# Patient Record
Sex: FEMALE | Race: WHITE | NOT HISPANIC OR LATINO | Employment: OTHER | URBAN - METROPOLITAN AREA
[De-identification: names, ages, dates, MRNs, and addresses within clinical notes are randomized per-mention and may not be internally consistent; named-entity substitution may affect disease eponyms.]

---

## 2023-07-20 ENCOUNTER — TELEPHONE (OUTPATIENT)
Dept: NEUROLOGY | Facility: CLINIC | Age: 68
End: 2023-07-20

## 2023-07-20 NOTE — TELEPHONE ENCOUNTER
----- Message from Micaela Luna sent at 7/20/2023  3:24 PM CDT -----  Contact: Esperanza with [facility]  Type:  Sooner Apoointment Request    Caller is requesting a sooner appointment. Caller will not accept being placed on the waitlist and is requesting a message be sent to doctor.  Name of Caller:Esperanza with [facility]  When is the first available appointment? unknown  Symptoms:Dementia  Would the patient rather a call back or a response via [facility]? call  Best Call Back Number:[phone]  Additional Information: Esperanza request to schedule soonest available appointment for the patient. Please call Esperanza to assist.

## 2023-07-20 NOTE — TELEPHONE ENCOUNTER
Spoke with Esperanza advised her that pt appt is still scheduled for 10/16/2023  last name was was spelled in correctly chart corrected MRN [id number]. Chart set to merge

## 2023-10-16 ENCOUNTER — OFFICE VISIT (OUTPATIENT)
Dept: NEUROLOGY | Facility: CLINIC | Age: 68
End: 2023-10-16
Payer: MEDICARE

## 2023-10-16 ENCOUNTER — HOSPITAL ENCOUNTER (OUTPATIENT)
Dept: CARDIOLOGY | Facility: HOSPITAL | Age: 68
Discharge: HOME | End: 2023-10-16
Payer: MEDICARE

## 2023-10-16 VITALS
SYSTOLIC BLOOD PRESSURE: 125 MMHG | RESPIRATION RATE: 16 BRPM | HEIGHT: 69 IN | DIASTOLIC BLOOD PRESSURE: 67 MMHG | HEART RATE: 77 BPM | BODY MASS INDEX: 32.98 KG/M2 | WEIGHT: 222.69 LBS

## 2023-10-16 DIAGNOSIS — Z81.8 FAMILY HISTORY OF DEMENTIA: ICD-10-CM

## 2023-10-16 DIAGNOSIS — G30.9 MAJOR NEUROCOGNITIVE DISORDER DUE TO ALZHEIMER'S DISEASE, WITHOUT BEHAVIORAL DISTURBANCE: Primary | ICD-10-CM

## 2023-10-16 DIAGNOSIS — Z87.898 HISTORY OF SEIZURES: ICD-10-CM

## 2023-10-16 DIAGNOSIS — Z91.89 AT RISK FOR PROLONGED QT INTERVAL SYNDROME: ICD-10-CM

## 2023-10-16 DIAGNOSIS — R68.89 FORGETFULNESS: ICD-10-CM

## 2023-10-16 DIAGNOSIS — F02.80 MAJOR NEUROCOGNITIVE DISORDER DUE TO ALZHEIMER'S DISEASE, WITHOUT BEHAVIORAL DISTURBANCE: Primary | ICD-10-CM

## 2023-10-16 PROCEDURE — 1101F PT FALLS ASSESS-DOCD LE1/YR: CPT | Mod: CPTII,S$GLB,, | Performed by: NURSE PRACTITIONER

## 2023-10-16 PROCEDURE — 93005 ELECTROCARDIOGRAM TRACING: CPT

## 2023-10-16 PROCEDURE — 1125F PR PAIN SEVERITY QUANTIFIED, PAIN PRESENT: ICD-10-PCS | Mod: CPTII,S$GLB,, | Performed by: NURSE PRACTITIONER

## 2023-10-16 PROCEDURE — 93010 ELECTROCARDIOGRAM REPORT: CPT | Mod: ,,, | Performed by: INTERNAL MEDICINE

## 2023-10-16 PROCEDURE — 99417 PROLNG OP E/M EACH 15 MIN: CPT | Mod: S$GLB,,, | Performed by: NURSE PRACTITIONER

## 2023-10-16 PROCEDURE — 1159F PR MEDICATION LIST DOCUMENTED IN MEDICAL RECORD: ICD-10-PCS | Mod: CPTII,S$GLB,, | Performed by: NURSE PRACTITIONER

## 2023-10-16 PROCEDURE — 99205 PR OFFICE/OUTPT VISIT, NEW, LEVL V, 60-74 MIN: ICD-10-PCS | Mod: S$GLB,,, | Performed by: NURSE PRACTITIONER

## 2023-10-16 PROCEDURE — 93010 EKG 12-LEAD: ICD-10-PCS | Mod: ,,, | Performed by: INTERNAL MEDICINE

## 2023-10-16 PROCEDURE — 3078F DIAST BP <80 MM HG: CPT | Mod: CPTII,S$GLB,, | Performed by: NURSE PRACTITIONER

## 2023-10-16 PROCEDURE — 1159F MED LIST DOCD IN RCRD: CPT | Mod: CPTII,S$GLB,, | Performed by: NURSE PRACTITIONER

## 2023-10-16 PROCEDURE — 99999 PR PBB SHADOW E&M-EST. PATIENT-LVL V: CPT | Mod: PBBFAC,,, | Performed by: NURSE PRACTITIONER

## 2023-10-16 PROCEDURE — 99417 PR PROLONGED SVC, OUTPT, W/WO DIRECT PT CONTACT,  EA ADDTL 15 MIN: ICD-10-PCS | Mod: S$GLB,,, | Performed by: NURSE PRACTITIONER

## 2023-10-16 PROCEDURE — 99999 PR PBB SHADOW E&M-EST. PATIENT-LVL V: ICD-10-PCS | Mod: PBBFAC,,, | Performed by: NURSE PRACTITIONER

## 2023-10-16 PROCEDURE — 3074F PR MOST RECENT SYSTOLIC BLOOD PRESSURE < 130 MM HG: ICD-10-PCS | Mod: CPTII,S$GLB,, | Performed by: NURSE PRACTITIONER

## 2023-10-16 PROCEDURE — 1125F AMNT PAIN NOTED PAIN PRSNT: CPT | Mod: CPTII,S$GLB,, | Performed by: NURSE PRACTITIONER

## 2023-10-16 PROCEDURE — 3008F BODY MASS INDEX DOCD: CPT | Mod: CPTII,S$GLB,, | Performed by: NURSE PRACTITIONER

## 2023-10-16 PROCEDURE — 3008F PR BODY MASS INDEX (BMI) DOCUMENTED: ICD-10-PCS | Mod: CPTII,S$GLB,, | Performed by: NURSE PRACTITIONER

## 2023-10-16 PROCEDURE — 1101F PR PT FALLS ASSESS DOC 0-1 FALLS W/OUT INJ PAST YR: ICD-10-PCS | Mod: CPTII,S$GLB,, | Performed by: NURSE PRACTITIONER

## 2023-10-16 PROCEDURE — 99205 OFFICE O/P NEW HI 60 MIN: CPT | Mod: S$GLB,,, | Performed by: NURSE PRACTITIONER

## 2023-10-16 PROCEDURE — 3074F SYST BP LT 130 MM HG: CPT | Mod: CPTII,S$GLB,, | Performed by: NURSE PRACTITIONER

## 2023-10-16 PROCEDURE — 3288F PR FALLS RISK ASSESSMENT DOCUMENTED: ICD-10-PCS | Mod: CPTII,S$GLB,, | Performed by: NURSE PRACTITIONER

## 2023-10-16 PROCEDURE — 3078F PR MOST RECENT DIASTOLIC BLOOD PRESSURE < 80 MM HG: ICD-10-PCS | Mod: CPTII,S$GLB,, | Performed by: NURSE PRACTITIONER

## 2023-10-16 PROCEDURE — 3288F FALL RISK ASSESSMENT DOCD: CPT | Mod: CPTII,S$GLB,, | Performed by: NURSE PRACTITIONER

## 2023-10-16 RX ORDER — LEVOTHYROXINE SODIUM 112 UG/1
112 TABLET ORAL
COMMUNITY
Start: 2023-08-08

## 2023-10-16 RX ORDER — PROMETHAZINE HYDROCHLORIDE 25 MG/1
25 TABLET ORAL EVERY 6 HOURS PRN
COMMUNITY

## 2023-10-16 RX ORDER — SERTRALINE HYDROCHLORIDE 100 MG/1
150 TABLET, FILM COATED ORAL
COMMUNITY
Start: 2023-10-02

## 2023-10-16 RX ORDER — DONEPEZIL HYDROCHLORIDE 10 MG/1
10 TABLET, FILM COATED ORAL NIGHTLY
Qty: 30 TABLET | Refills: 11 | Status: SHIPPED | OUTPATIENT
Start: 2023-10-16 | End: 2024-10-15

## 2023-10-16 RX ORDER — MEMANTINE HYDROCHLORIDE 10 MG/1
10 TABLET ORAL 2 TIMES DAILY
Qty: 60 TABLET | Refills: 11 | Status: SHIPPED | OUTPATIENT
Start: 2023-10-16 | End: 2024-10-15

## 2023-10-16 RX ORDER — SIMVASTATIN 20 MG/1
20 TABLET, FILM COATED ORAL NIGHTLY
COMMUNITY

## 2023-10-16 RX ORDER — GABAPENTIN 300 MG/1
300 CAPSULE ORAL 3 TIMES DAILY
COMMUNITY

## 2023-10-16 NOTE — PROGRESS NOTES
Subjective:       Patient ID: Liliana Martinez is a 68 y.o. female.    Chief Complaint: Dementia          HPIThe patient is here for memory loss evaluation without behavioral changes.     The patient is new to me, presenting with memory issues that began in  2020-year. The patient is accompanied by spouse. The main problems the patient has are related to forgetfulness and short term memory loss. Patient also concerned due to muliple family members with AD. Patient spouse gave many examples: For example, the patient would repeat the same question repeatedly. The patient excessively forgets where placed certain things. She misplace things and spouse assist with finding.  The patient also started forgetting names but able to recover. The patient no longer driving, stopped driving 2022 she had a seizure. No confusion around and inside the house. Has trouble remembering the date and time. Spouse assist with  keeping up with medications and appointments. Patient is unaware of major holidays and  Paitent is aware changes. The patient is independent in handling finances. The patient is still independent with ADLs. No agitation or aggression. No hallucinations or delusions. Patient had hx of  seizures, reports she was not started on seizures medications. However patient is taking   TID for chronic knee pain. Patient has history of hypothyroidism. No history of alcoholism. No history of B12 deficiency. No history of Untreated Syphilis.  No history of HIV infection. No toxic exposures.  No history of traumatic brain injury. No tremors or abnormal movements. No falls or instability. No urinary incontinence. No change in sleep and good appetite. Mother had Dementia (Late 70's) Sister (72 ye family history of dementia.           Review of Systems   Unable to perform ROS: Dementia   Constitutional: Negative.    HENT: Negative.     Eyes: Negative.    Gastrointestinal: Negative.    Endocrine: Negative.    Genitourinary:  Negative.    Musculoskeletal:  Positive for arthralgias and myalgias.   Skin: Negative.    Psychiatric/Behavioral:  Positive for confusion, decreased concentration and dysphoric mood. The patient is nervous/anxious.                  Current Outpatient Medications:     gabapentin (NEURONTIN) 300 MG capsule, Take 300 mg by mouth 3 (three) times daily., Disp: , Rfl:     levothyroxine (SYNTHROID) 112 MCG tablet, Take 112 mcg by mouth., Disp: , Rfl:     promethazine (PHENERGAN) 25 MG tablet, Take 25 mg by mouth every 6 (six) hours as needed for Nausea., Disp: , Rfl:     sertraline (ZOLOFT) 100 MG tablet, Take 150 mg by mouth., Disp: , Rfl:     simvastatin (ZOCOR) 20 MG tablet, Take 20 mg by mouth every evening., Disp: , Rfl:     donepeziL (ARICEPT) 10 MG tablet, Take 1 tablet (10 mg total) by mouth every evening., Disp: 30 tablet, Rfl: 11    memantine (NAMENDA) 10 MG Tab, Take 1 tablet (10 mg total) by mouth 2 (two) times daily., Disp: 60 tablet, Rfl: 11  History reviewed. No pertinent past medical history.  Past Surgical History:   Procedure Laterality Date    TOTAL KNEE ARTHROPLASTY       Social History     Socioeconomic History    Marital status:    Tobacco Use    Smoking status: Never    Smokeless tobacco: Never   Substance and Sexual Activity    Alcohol use: Never    Drug use: Never    Sexual activity: Yes             Past/Current Medical/Surgical History, Past/Current Social History, Past/Current Family History and Past/Current Medications were reviewed in detail.        Objective:           VITAL SIGNS WERE REVIEWED      GENERAL APPEARANCE:     The patient looks comfortable.    Body habitus is normal 32.88 KG     No signs of respiratory distress.    Normal breathing pattern.    No dysmorphic features    Normal eye contact.     GENERAL MEDICAL EXAM:    HEENT:  Head is atraumatic normocephalic.     No tender temporal arteries. Fundoscopic (Ophthalmoscopic) exam showed no disc edema.      Neck and Axillae: No  JVD. No visible lesions.    No carotid bruits. No thyromegaly. No lymphadenopathy.    Cardiopulmonary: No cyanosis. No tachypnea. Normal respiratory effort.    Gastrointestinal/Urogenital:  No jaundice. No stomas or lesions. No visible hernias. No catheters.     Abdomen is soft non-tender. No masses or organomegaly.    Skin, Hair and Nails: No pathognonomic skin rash. No neurofibromatosis. No visible lesions.No stigmata of autoimmune disease. No clubbing.    Skin is warm and moist. No palpable masses.    Limbs: No varicose veins. No visible swelling.    No palpable edema. Pulses are symmetric. Pedal pulses are palpable.      Muskoskeletal: No visible deformities.No visible lesions.    No spine tenderness. No signs of longstanding neuropathy. No dislocations or fractures.            Neurologic Exam     Mental Status   Oriented to person, place, and time.   Follows 2 step commands.   Attention: decreased.   Speech: speech is normal and not slurred   Level of consciousness: alert  Knowledge: inconsistent with education and poor. Unable to perform simple calculations.   Able to name object. Able to read. Able to repeat. Able to write (abnormal unble to follow simple commanad). Abnormal comprehension.     Cranial Nerves     CN II   Visual fields full to confrontation.   Visual acuity: normal with correction  Right visual field deficit: none  Left visual field deficit: none     CN III, IV, VI   Pupils are equal, round, and reactive to light.  Extraocular motions are normal.   Right pupil: Size: 2 mm. Shape: regular. Reactivity: brisk. Consensual response: intact. Accommodation: intact.   Left pupil: Size: 2 mm. Shape: regular. Reactivity: brisk. Consensual response: intact. Accommodation: intact.   CN III: no CN III palsy  CN VI: no CN VI palsy  Nystagmus: none   Diplopia: none  Ophthalmoparesis: none  Upgaze: normal  Downgaze: normal  Conjugate gaze: present  Vestibulo-ocular reflex: present    CN V   Facial sensation  intact.   Right facial sensation deficit: none  Left facial sensation deficit: none    CN VII   Right facial weakness: none  Left facial weakness: none    CN VIII   CN VIII normal.   Hearing: intact    CN IX, X   CN IX normal.   CN X normal.   Palate: symmetric  Right gag reflex: normal  Left gag reflex: normal    CN XI   CN XI normal.   Right sternocleidomastoid strength: normal  Left sternocleidomastoid strength: normal  Right trapezius strength: normal  Left trapezius strength: normal    CN XII   CN XII normal.   Tongue: not atrophic  Fasciculations: absent  Tongue deviation: none    Motor Exam   Muscle bulk: normal  Overall muscle tone: normal  Right arm tone: normal  Left arm tone: normal  Right arm pronator drift: absent  Left arm pronator drift: absent  Right leg tone: normal  Left leg tone: normal    Strength   Strength 5/5 throughout.   Right neck flexion: 5/5  Left neck flexion: 5/5  Right neck extension: 5/5  Left neck extension: 5/5  Right deltoid: 5/5  Left deltoid: 5/5  Right biceps: 5/5  Left biceps: 5/5  Right triceps: 5/5  Left triceps: 5/5  Right wrist flexion: 5/5  Left wrist flexion: 5/5  Right wrist extension: 5/5  Left wrist extension: 5/5  Right interossei: 5/5  Left interossei: 5/5  Right iliopsoas: 5/5  Left iliopsoas: 5/5  Right quadriceps: 5/5  Left quadriceps: 5/5  Right hamstrin/5  Left hamstrin/5  Right glutei: 5/5  Left glutei: 5/5  Right anterior tibial: 5/5  Left anterior tibial: 5/5  Right posterior tibial: 5/5  Left posterior tibial: 5/5  Right peroneal: 5/5  Left peroneal: 5/5  Right gastroc: 5/5  Left gastroc: 5/5    Sensory Exam   Right arm light touch: normal  Left arm light touch: normal  Right leg light touch: decreased from toes  Left leg light touch: decreased from toes  Right arm vibration: normal  Left arm vibration: normal  Right leg vibration: decreased from toes  Left leg vibration: decreased from toes  Right arm proprioception: normal  Left arm proprioception:  "normal  Right leg proprioception: decreased from toes  Left leg proprioception: decreased from toes  Right arm pinprick: normal  Left arm pinprick: normal  Right leg pinprick: decreased from toes  Left leg pinprick: decreased from toes  Graphesthesia: normal  Stereognosis: normal    Gait, Coordination, and Reflexes     Gait  Gait: normal    Coordination   Romberg: negative  Finger to nose coordination: normal  Heel to shin coordination: normal  Tandem walking coordination: normal    Tremor   Resting tremor: absent  Intention tremor: absent  Action tremor: absent    Reflexes   Right brachioradialis: 2+  Left brachioradialis: 2+  Right biceps: 2+  Left biceps: 2+  Right triceps: 2+  Left triceps: 2+  Right patellar: 2+  Left patellar: 2+  Right achilles: 2+  Left achilles: 2+  Right plantar: normal  Left plantar: normal  Right Allan: absent  Left Allan: absent  Right ankle clonus: absent  Left ankle clonus: absent  Right pendular knee jerk: absent  Left pendular knee jerk: absent          JORJE COGNITIVE ASSESSMENT (MOCA) TOTAL SCORE 16/30    MODERATE DEMENTIA 10-19    SEVERE DEMENTIA <10        DATE 10-       TOTAL SCORE 16/30       VISUOSPATIAL EXECUTIVE (5) 1       NAMING (3) 3       ATTENTION (6) 3       LANGUAGE (3) 3       ABSTRACTION(2) 2       RECALL (5)    (Recover with CUE 3)  0       ORIENTATION (6) 4           No results found for: "WBC", "HGB", "HCT", "MCV", "PLT"  No results found for: "NA", "K", "CL", "CO2", "GLU", "BUN", "CREATININE", "CALCIUM", "PROT", "ALBUMIN", "BILITOT", "ALKPHOS", "AST", "ALT", "ANIONGAP", "ESTGFRAFRICA", "EGFRNONAA"  Lab Results   Component Value Date    DBADRIIQ90 1207 (H) 10/16/2023     Lab Results   Component Value Date    TSH 3.896 10/16/2023    FREET4 0.83 10/16/2023   Labs Reviewed:    10-    Vitamin B 12 1207 ^, RPR Neg, HIV neg, FA level 16.0 HC 6.6 NL, TSH level 3.896 NL, T4 0.83 NL,     No results found in the last 24 hours.      Reviewed the " neuroimaging independently       Assessment:       1. Major neurocognitive disorder due to Alzheimer's disease, without behavioral disturbance    2. History of seizures    3. Forgetfulness    4. At risk for prolonged QT interval syndrome    5. Family history of dementia        Plan:           MAJOR NEUROCOGNITIVE DISORDER DUE TO POSSIBLE ALZHEIMER'S DISEASE WITHOUT BEHAVIORAL DISTURBANCE/FORGETFULNESS/ MDD/YASSINE/FAMILY HISTORY OF DEMENTIA          EVALUATION     Brain MRI Completed 3 months prior at [facility], will request records     TSH-T4, FA, B12, HIV, RPR.    Comprehensive Neuropsychological Testing     No longer driving.     DISEASE-MODIFYING AGENTS     Explained to the patient and family that medications are intended to slow down the progression (in the early stages of the disease) and not stop it or reverse the disease. The disease is relentless, progressive and so far, cannot be controlled. Progression includes Cognitive decline, Behavioral disturbances, and Motor decline as well.     I counseled the patient and family that the rate of progression is extremely variable, and the average (10 years) is an inaccurate measure.     CLASSES:    NMDA RECEPTOR ANTAGONISTS    Will start memantine/Namenda and titrate slowly to 10 mg BID. The side effects include dizziness, seizures and nightmares and discussed with patient and family. (Other formulations include Namenda XR)    CHOLINESTERASE INHIBITORS (CEI)    Will start or continue donepezil/Aricept and titrate slowly to 10 mg QHS. The side effects include dizziness, diarrhea and nightmares and discussed with patient and family.  It can rarely cause bradycardia, syncope, and prolonged QT.     If GI symptoms become an issue will try rivastigmine/Exelon patches. Will obtain EKG before and after Aricept to verify effects on QT interval!  (Other formulations Adlarity TTS 5/10 mg weekly). Patches are convenient, bypass the GI system but have unintended  consequences of being taken off prematurely or being duplicated accidentally.       ANTI-AMYLOID MONOCLONAL ANTIBODIES (SPECIFICALLY FOR ALZHEIMER-TYPE DEMENTIA)      Aducanumab (Aduhelm), Lecanemab (Leqembi) and Donanemab. The actual cognitive benefits remain unsubstantiated and the risks (including death) may outweigh the benefit.The method of administration and cost remain prohibitive as well.  Will consider such options after high quality studies and post marketing experiencing demonstrate significant clinical efficacy and safety.         MISCELLANEOUS     [facility] study regarding Sildenafil (Viagra) impact on dementia showed possible benefit. Will wait for high quality prospective double-blinded placebo-controlled trials to make any proper recommendations.     Recommended against the use of OTC non-FDA evaluated supplements and claims.         SYMPTOMATIC MANAGEMENT-BEHAVIORAL SYMPTOMS AND NON-COGNITIVE SYMPTOMS       ANTIDEPRESSANTS CLASS MEDICATIONS  Sertraline (Zoloft) titration 50  mg in the morning can mitigate anxiety symptoms.           HOME CARE AND IN FACILITY CARE         Falling Down Precautions. Gait is affected by the disease as well.     Avoid driving and access to firearms     Incremental 24/Care     Help with finances and decision making.    Join support group.    Proofing the house and use labeling.    Avoid antihistamines and anticholinergics.    Avoid changing routine.    Use written reminders.    Avoid multitasking.    Healthy diet, exercise (physical and cognitive).    Good sleep hygiene.        HERE ARE 10 WAYS TO REDUCE RISK OF DEMENTIA AND SLOW DOWN THE PROGRESSION OF DEMENTIA        1.Be physically active.    2. Avoid smoking and alcohol consumption.    3. Track your numbers. Keep your blood pressure, cholesterol, blood sugar and weight within recommended ranges.    4. Stay socially connected.    5. Make healthy food choices. Eat a well-balance and healthy diet that rich in  cereals, fish, legumes, and vegetables.    6. Reduce stress.     7. Challenge your brain by trying something new, playing games, or learning a new language.    8. Take care of your hearing. Avoid being continuously exposed to loud sounds and wear a hearing aid if hearing does become a problem.    9. Lower risk of falls. Consider installing handrails on all stairs and grab bars in bathrooms.    10. Reduce your exposure to air pollution, such as exposure to exhaust in heavy traffic.         CAREGIVERS COUNSELING     Recommend reading the following books:     The 36-Hour Day and there are many online and printed resources to help caregivers as well.     Alzheimer's Through the Stages.     Dementia with G.R.A.C.E.            PREVENTION OF DELIRIUM       1. Good hydration and avoid electrolyte imbalance  2. Recognize and treat infections immediately especially UTI.  3. Bladder emptying and prevent constipation.   4. Provide stimulating activities and familiar objects  5. Use eyeglasses and hearing aids if needed.   6. Use simple and regular communication about people, current place, and time  7. Mobility and range-of-motion exercises  8. Reduce noise, lighting and avoid sleep interruptions  9. Non-narcotic pain management.  10.Nondrug treatment for sleep problems or anxiety  11. Avoid antihistamines.  12. Avoid narcotics.  13. Avoid benzodiazepines.            HELPFUL STRATEGIES FOR THE FAMILY AND CAREGIVERS        BEHAVIORAL MANAGEMENT STRATEGIES     Remember that you cannot reason with acute psychosis or confusion. Unless there is an immediate safety issue, there is no need to challenge or correct the person.  For example, if a pt is hallucinating, do not argue with the person that what they are seeing is not real. If they are expressing paranoia, empathize gently with their fear, and attempt to redirect them to a different activity.   If the person is particularly stuck on a topic or activity, as long as the activity  "is safe and is not worsening their agitation, you don't need to intervene.     Communicate calmly, simply, and concisely    Reassure the person that they are safe and you are here to help  Try not to express irritation or anger  Speak quietly and calmly, do not shout or threaten the person. Avoid provocation.   Establish verbal contact and provide orientation and reassurance.  Attempt to identify the patient's wants and feelings, listen to what they are saying, and reflect those wants and feelings back to the patient.  Dont use sarcasm as a weapon    Set clear limits on behavior in a calm voice ("You cannot hit the nurse.")  Offer choices and optimism ("Which toothpaste would you like to use today?")    Redirect the person to an alternative behavior ("Can you come help me with this?)    Avoid saying things like, "We already went over this!" "You can't keep doing this." "I already explained this to you"  Instead, simply nod calmly and acknowledge what the person is saying ("Yes, that makes sense."), and then request their help or company in a different behavior.   Having a mental list of activities the patient enjoys can be helpful with redirection. For example, do they enjoy going for walks? Eating a piece of candy?   If redirection becomes challenging, you can place objects that your family member enjoys strategically in the home in order to use for distraction. This is particularly useful if there are items that they often talk about or frequently ask you questions about; or if they are visually striking. Once you focus the person on this object, talk about it briefly until they are calm and then attempt to redirect to a new behavior.   Sometimes activities which are repetitive can be calming, particularly if there is a comforting sensory element. For example, pt may enjoy folding soft towels or laundry.    Limit overstimulation    Decrease distractions by turning off the TV, computer, any fluorescent lights that " "hum, etc.  Ask any casual visitors to leave--the fewer people the better  If the person is very agitated, avoid touching them, and respect their personal space  Sit down and ask the person to sit down also     PREVENTATIVE STRATEGIES     Try to help the patient develop a routine and stick to it  Address all immediate safety issues  Does the person still have access to the car and keys? Take the keys away, or disconnect the car battery.  Are they wandering at night? Install locks on the outside doors. A keypad lock works well. Alarms on bedroom doors can also be helpful.   Are they turning on the stove and risking a fire? If you cannot limit their access to the kitchen, you may need to unplug dangerous devices any time you are not in the room.   If the person is exhibiting poor judgment throughout the day, they likely need someone supervising them at all times.   Do they still have access to significant financial assets? Limit their access to accounts, and consult with a  if necessary.   Identify situations that seem to trigger agitation or a problematic behavior, and modify the person's environment to minimize or eliminate that trigger.   For example, is their behavior worse if they don't get a good night's sleep? Or if they don't eat or drink enough? If so, prioritize those activities and build behavior plans to support them.  Do they get upset about not being allowed to answer the phone when it rings? Put all of the phones in the house on "silent."   Do they become paranoid that certain family members are trying to take advantage of them? Limit contact with the targeted family member as much as possible, and re-introduce them slowly after some time has passed.   Encourage independence in daily living whenever safely possible  Encourage collaborative decision-making regarding his care as well as daily activities  Encourage regular physical exercise  Address issues that may be impacting sleep   Ex: Urology " consult for frequent nighttime urination, address chronic pain that may be interfering with sleep, moving to a different room if his roommate snores loudly, make sure the room is a comfortable temperature and he has adequate pillows and blankets  Ensure he gets out into the sunlight at least once per day to encourage regular circadian cycle  No caffeine, sugar, or large meals within two hours of bedtime   Make sure room at night is dark and quiet and minimize nighttime interruptions from staff unless medically necessary   Try to help pt go to sleep and wake up at the same time every day  Monitor closely for worsening psychiatric symptoms (insomnia, social withdrawal, deterioration of personal hygiene, hostility, confusing or nonsensical speech, paranoia, hallucinations) and intervene promptly. Assess for possible antecedents, modify environment appropriately.            SLEEP HYGIENE     Poor sleep has a negative effect on cognition. Several strategies have been shown to improve sleep:     Caffeine intake in the afternoon and evening, as well as stuffing oneself at supper, can decrease the quality of restful sleep throughout the night.   Bedtime and wake-up times should be consistent every night and morning so the body becomes used to a single routine, even on the weekends.  Engage in daily physical activity, but not 2-3 hours before bedtime.   No technology use (television, computer, iPad) 1-2 hours before bed.   Have a wind down routine (e.g., soft lights in the house, bath before bed, reduced fluid intake, songs, reading, less noise) to promote sleep readiness.   Visit the www.sleepfoundation.org for more strategies.      COGNITIVE HYGIENE     Engage in regular exercise, which increases alertness and arousal and can improve attention and focus.  Consider lower impact exercises, such as yoga or light walking.  Get a good nights sleep, as this can enhance alertness and cognition.  Eat healthy foods and balanced  meals. It is notable that research indicates certain nutrients may aid in brain function, such as B vitamins (especially B6, B12, and folic acid), antioxidants (such as vitamins C and E, and beta carotene), and Omega-3 fatty acids. Talk with your physician or nutritionist about whats right for you.   Keep your brain active. Find activities to stay mentally active, such as reading, games (cards, checkers), puzzles (crosswords, Sudoku, jig saw), crafts (models, woodworking), gardening, or participating in activities in the community.  Stay socially engaged. Continue staying active with your family and friends.      FUTURE PLANNING     The patient and caregivers should consider formal arrangements to allow a designated person to make medical and financial decisions for the pt, should he/she become unable to do so.  Options to consider include designating a healthcare proxy, medical and/or financial power of , and completing advanced directives for healthcare decisions and estate planning (e.g., finalizing a will).  If cost is prohibitive, [facility] [employer] [facility] (https://[employer]/) provides free  for individuals with low income.       ADDITIONAL RESOURCES     [facility] (www.http://alzbr.org) have good local caregiver and patient resources.   Consider resources for support through the [facility] Office of Elderly Affairs (http://goea.[place].gov/), [place] Chapter of the Alzheimers Association (www.alz.org/[facility]/), the Family Caregiver [place] (www.caregiver.org), and the American Psychological Association (http://www.apa.org/pi/about/publications/caregivers/consumers/index.aspxconsumers/index.aspx).  For More Information About Hallucinations, Delusions, and Paranoia in Alzheimer's [place] Alzheimer's and related Dementias Education and Referral (ADEAR) Center 1-780.462.9756 (toll-free) júnior@joaquín.nih.gov www.joaqu[place].nih.gov/alzheimers The National [place]  on Aging's [facility] offers information and free print publications about Alzheimer's disease and related dementias for families, caregivers, and health professionals. [facility] staff answer telephone, email, and written requests and make referrals to local and national resources        CARRIES DX OF HISTORY SEIZURES     EEG    Taking  mg po TID ( taking for CHRONIC knee pain managed by PCP)       MEDICAL/SURGICAL COMORBIDITIES     All relevant medical comorbidities noted and managed by primary care physician and medical care team.          MISCELLANEOUS MEDICAL PROBLEMS       HEALTHY LIFESTYLE AND PREVENTATIVE CARE    Encouraged the patient to adhere to the age-appropriate health maintenance guidelines including screening tests and vaccinations.     Discussed the overall importance of healthy lifestyle, optimal weight, exercise, healthy diet, good sleep hygiene and avoiding drugs including smoking, alcohol and recreational drugs. The patient verbalized full understanding.       Advised the patient to follow COVID-19 prevention measures.       I spent 130 minutes total E/M more than 50 spent face to face.     Time spent in counseling and coordination of care including discussions etiology of diagnosis, pathogenesis of diagnosis, prognosis of diagnosis,, diagnostic results, impression and recommendations, diagnostic studies, management, risks and benefits of treatment, instructions of disease self-management, treatment instructions, follow up requirements, patient and family counseling/involvement in care compliance with treatment regimen. All of the patient's questions were answered during this discussion.         Kaela Briggs, MSN NP      Collaborating Provider: Isaura Holden MD, FAAN Neurologist/Epileptologist

## 2023-10-16 NOTE — PATIENT INSTRUCTIONS
HERE ARE 10 WAYS TO REDUCE RISK OF DEMENTIA AND SLOW DOWN THE PROGRESSION OF DEMENTIA        1.Be physically active.    2. Avoid smoking and alcohol consumption.    3. Track your numbers. Keep your blood pressure, cholesterol, blood sugar and weight within recommended ranges.    4. Stay socially connected.    5. Make healthy food choices. Eat a well-balance and healthy diet that rich in cereals, fish, legumes, and vegetables.    6. Reduce stress.     7. Challenge your brain by trying something new, playing games, or learning a new language.    8. Take care of your hearing. Avoid being continuously exposed to loud sounds and wear a hearing aid if hearing does become a problem.    9. Lower risk of falls. Consider installing handrails on all stairs and grab bars in bathrooms.    10. Reduce your exposure to air pollution, such as exposure to exhaust in heavy traffic.         CAREGIVERS COUNSELING     Recommend reading the following books:     The 36-Hour Day and there are many online and printed resources to help caregivers as well.     Alzheimer's Through the Stages.     Dementia with G.R.A.C.E.            PREVENTION OF DELIRIUM       1. Good hydration and avoid electrolyte imbalance  2. Recognize and treat infections immediately especially UTI.  3. Bladder emptying and prevent constipation.   4. Provide stimulating activities and familiar objects  5. Use eyeglasses and hearing aids if needed.   6. Use simple and regular communication about people, current place, and time  7. Mobility and range-of-motion exercises  8. Reduce noise, lighting and avoid sleep interruptions  9. Non-narcotic pain management.  10.Nondrug treatment for sleep problems or anxiety  11. Avoid antihistamines.  12. Avoid narcotics.  13. Avoid benzodiazepines.            HELPFUL STRATEGIES FOR THE FAMILY AND CAREGIVERS        BEHAVIORAL MANAGEMENT STRATEGIES     Remember that you cannot reason with acute psychosis or confusion. Unless there is  "an immediate safety issue, there is no need to challenge or correct the person.  For example, if a pt is hallucinating, do not argue with the person that what they are seeing is not real. If they are expressing paranoia, empathize gently with their fear, and attempt to redirect them to a different activity.   If the person is particularly stuck on a topic or activity, as long as the activity is safe and is not worsening their agitation, you don't need to intervene.     Communicate calmly, simply, and concisely    Reassure the person that they are safe and you are here to help  Try not to express irritation or anger  Speak quietly and calmly, do not shout or threaten the person. Avoid provocation.   Establish verbal contact and provide orientation and reassurance.  Attempt to identify the patient's wants and feelings, listen to what they are saying, and reflect those wants and feelings back to the patient.  Dont use sarcasm as a weapon    Set clear limits on behavior in a calm voice ("You cannot hit the nurse.")  Offer choices and optimism ("Which toothpaste would you like to use today?")    Redirect the person to an alternative behavior ("Can you come help me with this?)    Avoid saying things like, "We already went over this!" "You can't keep doing this." "I already explained this to you"  Instead, simply nod calmly and acknowledge what the person is saying ("Yes, that makes sense."), and then request their help or company in a different behavior.   Having a mental list of activities the patient enjoys can be helpful with redirection. For example, do they enjoy going for walks? Eating a piece of candy?   If redirection becomes challenging, you can place objects that your family member enjoys strategically in the home in order to use for distraction. This is particularly useful if there are items that they often talk about or frequently ask you questions about; or if they are visually striking. Once you focus the " "person on this object, talk about it briefly until they are calm and then attempt to redirect to a new behavior.   Sometimes activities which are repetitive can be calming, particularly if there is a comforting sensory element. For example, pt may enjoy folding soft towels or laundry.    Limit overstimulation    Decrease distractions by turning off the TV, computer, any fluorescent lights that hum, etc.  Ask any casual visitors to leave--the fewer people the better  If the person is very agitated, avoid touching them, and respect their personal space  Sit down and ask the person to sit down also     PREVENTATIVE STRATEGIES     Try to help the patient develop a routine and stick to it  Address all immediate safety issues  Does the person still have access to the car and keys? Take the keys away, or disconnect the car battery.  Are they wandering at night? Install locks on the outside doors. A keypad lock works well. Alarms on bedroom doors can also be helpful.   Are they turning on the stove and risking a fire? If you cannot limit their access to the kitchen, you may need to unplug dangerous devices any time you are not in the room.   If the person is exhibiting poor judgment throughout the day, they likely need someone supervising them at all times.   Do they still have access to significant financial assets? Limit their access to accounts, and consult with a  if necessary.   Identify situations that seem to trigger agitation or a problematic behavior, and modify the person's environment to minimize or eliminate that trigger.   For example, is their behavior worse if they don't get a good night's sleep? Or if they don't eat or drink enough? If so, prioritize those activities and build behavior plans to support them.  Do they get upset about not being allowed to answer the phone when it rings? Put all of the phones in the house on "silent."   Do they become paranoid that certain family members are trying to " take advantage of them? Limit contact with the targeted family member as much as possible, and re-introduce them slowly after some time has passed.   Encourage independence in daily living whenever safely possible  Encourage collaborative decision-making regarding his care as well as daily activities  Encourage regular physical exercise  Address issues that may be impacting sleep   Ex: Urology consult for frequent nighttime urination, address chronic pain that may be interfering with sleep, moving to a different room if his roommate snores loudly, make sure the room is a comfortable temperature and he has adequate pillows and blankets  Ensure he gets out into the sunlight at least once per day to encourage regular circadian cycle  No caffeine, sugar, or large meals within two hours of bedtime   Make sure room at night is dark and quiet and minimize nighttime interruptions from staff unless medically necessary   Try to help pt go to sleep and wake up at the same time every day  Monitor closely for worsening psychiatric symptoms (insomnia, social withdrawal, deterioration of personal hygiene, hostility, confusing or nonsensical speech, paranoia, hallucinations) and intervene promptly. Assess for possible antecedents, modify environment appropriately.            SLEEP HYGIENE     Poor sleep has a negative effect on cognition. Several strategies have been shown to improve sleep:     Caffeine intake in the afternoon and evening, as well as stuffing oneself at supper, can decrease the quality of restful sleep throughout the night.   Bedtime and wake-up times should be consistent every night and morning so the body becomes used to a single routine, even on the weekends.  Engage in daily physical activity, but not 2-3 hours before bedtime.   No technology use (television, computer, iPad) 1-2 hours before bed.   Have a wind down routine (e.g., soft lights in the house, bath before bed, reduced fluid intake, songs, reading,  less noise) to promote sleep readiness.   Visit the www.sleepfoundation.org for more strategies.      COGNITIVE HYGIENE     Engage in regular exercise, which increases alertness and arousal and can improve attention and focus.  Consider lower impact exercises, such as yoga or light walking.  Get a good nights sleep, as this can enhance alertness and cognition.  Eat healthy foods and balanced meals. It is notable that research indicates certain nutrients may aid in brain function, such as B vitamins (especially B6, B12, and folic acid), antioxidants (such as vitamins C and E, and beta carotene), and Omega-3 fatty acids. Talk with your physician or nutritionist about whats right for you.   Keep your brain active. Find activities to stay mentally active, such as reading, games (cards, checkers), puzzles (crosswords, Sudoku, jig saw), crafts (models, woodworking), gardening, or participating in activities in the community.  Stay socially engaged. Continue staying active with your family and friends.      FUTURE PLANNING     The patient and caregivers should consider formal arrangements to allow a designated person to make medical and financial decisions for the pt, should he/she become unable to do so.  Options to consider include designating a healthcare proxy, medical and/or financial power of , and completing advanced directives for healthcare decisions and estate planning (e.g., finalizing a will).  If cost is prohibitive, [facility] [employer] Services (https://[employer].org/) provides free  for individuals with low income.       ADDITIONAL RESOURCES     [facility] (www.http://alzbr.org) have good local caregiver and patient resources.   Consider resources for support through the Governors Office of Elderly Affairs (http://goea.[place].gov/), [place] Chapter of the Alzheimers Association (www.alz.org/[place]/), the Family Caregiver [place]  (www.caregiver.org), and the American Psychological Association (http://www.apa.org/pi/about/publications/caregivers/consumers/index.aspxconsumers/index.aspx).  For More Information About Hallucinations, Delusions, and Paranoia in Alzheimer's [place] [facility] and related Dementias [facility]erral ([facility]) [facility] 1-408.240.2717 (toll-free) adear@[place].nih.gov www.[place].nih.gov/alzheimers The National [place] on [facility]'s [facility] offers information and free print publications about Alzheimer's disease and related dementias for families, caregivers, and health professionals. [facility] staff answer telephone, email, and written requests and make referrals to local and national resources

## 2023-10-17 ENCOUNTER — TELEPHONE (OUTPATIENT)
Dept: NEUROLOGY | Facility: CLINIC | Age: 68
End: 2023-10-17
Payer: MEDICARE

## 2023-10-17 NOTE — TELEPHONE ENCOUNTER
----- Message from Valeria Briggs NP sent at 10/17/2023 10:27 AM CDT -----  EKG Results:    10-    QT Interval 428 NL, 66 HR    Normal results

## 2023-10-30 ENCOUNTER — HOSPITAL ENCOUNTER (OUTPATIENT)
Dept: PULMONOLOGY | Facility: HOSPITAL | Age: 68
Discharge: HOME | End: 2023-10-30
Attending: NURSE PRACTITIONER
Payer: MEDICARE

## 2023-10-30 ENCOUNTER — TELEPHONE (OUTPATIENT)
Dept: NEUROLOGY | Facility: CLINIC | Age: 68
End: 2023-10-30
Payer: MEDICARE

## 2023-10-30 PROCEDURE — 95816 EEG AWAKE AND DROWSY: CPT

## 2023-10-30 PROCEDURE — 95816 PR EEG,W/AWAKE & DROWSY RECORD: ICD-10-PCS | Mod: S$GLB,,, | Performed by: PSYCHIATRY & NEUROLOGY

## 2023-10-30 PROCEDURE — 95816 EEG AWAKE AND DROWSY: CPT | Mod: S$GLB,,, | Performed by: PSYCHIATRY & NEUROLOGY

## 2023-10-30 NOTE — PROGRESS NOTES
DATE EEG PERFORMED:  10-.      DATE EEG INTERPRETED: 10-.                   DURATION OF EE MINUTES.        LEVEL OF CONSCIOUSENESS    Awake.      EEG BACKGROUND    The posterior dominant basic rhythm reaches 8-9 Hz, symmetric, reactive, well-modulated and well-sustained.         EEG CLASSIFICATION    Normal        IMPRESSION      The EEG is normal in the awake state.       There are no epileptiform discharges or lateralizing signs. No typical events were recorded. There is no electrographic evidence of seizure.There is no electrographic evidence of status epilepticus.         PLEASE NOTE THAT A NON-EPILEPTIFORM EEG DOES NOT RULE OUT EPILEPSY.        AMINATA PATEL MD, FAAN    Diplomate, American Board of Psychiatry and Neurology    Diplomate, American Board of Clinical Neurophysiology

## 2023-10-30 NOTE — TELEPHONE ENCOUNTER
----- Message from Isaura Holden MD sent at 10/30/2023  4:55 PM CDT -----      The EEG report added as addendum to Kaela's note from 10-.

## 2024-09-27 DIAGNOSIS — Z87.898 HISTORY OF SEIZURES: ICD-10-CM

## 2024-09-27 DIAGNOSIS — G30.9 MAJOR NEUROCOGNITIVE DISORDER DUE TO ALZHEIMER'S DISEASE, WITHOUT BEHAVIORAL DISTURBANCE: ICD-10-CM

## 2024-09-27 DIAGNOSIS — R68.89 FORGETFULNESS: ICD-10-CM

## 2024-09-27 DIAGNOSIS — F02.80 MAJOR NEUROCOGNITIVE DISORDER DUE TO ALZHEIMER'S DISEASE, WITHOUT BEHAVIORAL DISTURBANCE: ICD-10-CM

## 2024-09-27 RX ORDER — MEMANTINE HYDROCHLORIDE 10 MG/1
10 TABLET ORAL 2 TIMES DAILY
Qty: 60 TABLET | Refills: 11 | Status: SHIPPED | OUTPATIENT
Start: 2024-09-27

## 2024-09-27 RX ORDER — DONEPEZIL HYDROCHLORIDE 10 MG/1
10 TABLET, FILM COATED ORAL NIGHTLY
Qty: 30 TABLET | Refills: 11 | Status: SHIPPED | OUTPATIENT
Start: 2024-09-27

## 2024-11-08 DIAGNOSIS — F02.80 MAJOR NEUROCOGNITIVE DISORDER DUE TO ALZHEIMER'S DISEASE, WITHOUT BEHAVIORAL DISTURBANCE: Primary | ICD-10-CM

## 2024-11-08 DIAGNOSIS — G30.9 MAJOR NEUROCOGNITIVE DISORDER DUE TO ALZHEIMER'S DISEASE, WITHOUT BEHAVIORAL DISTURBANCE: Primary | ICD-10-CM

## 2024-11-08 DIAGNOSIS — R68.89 FORGETFULNESS: ICD-10-CM

## 2024-11-08 DIAGNOSIS — Z81.8 FAMILY HISTORY OF DEMENTIA: ICD-10-CM

## 2025-02-06 ENCOUNTER — LAB VISIT (OUTPATIENT)
Dept: LAB | Facility: HOSPITAL | Age: 70
End: 2025-02-06
Attending: NURSE PRACTITIONER
Payer: MEDICARE

## 2025-02-06 ENCOUNTER — OFFICE VISIT (OUTPATIENT)
Dept: NEUROLOGY | Facility: CLINIC | Age: 70
End: 2025-02-06
Payer: MEDICARE

## 2025-02-06 VITALS
DIASTOLIC BLOOD PRESSURE: 82 MMHG | RESPIRATION RATE: 16 BRPM | HEIGHT: 69 IN | HEART RATE: 66 BPM | BODY MASS INDEX: 32 KG/M2 | SYSTOLIC BLOOD PRESSURE: 137 MMHG | WEIGHT: 216.06 LBS

## 2025-02-06 DIAGNOSIS — G30.9 MAJOR NEUROCOGNITIVE DISORDER DUE TO ALZHEIMER'S DISEASE, WITHOUT BEHAVIORAL DISTURBANCE: ICD-10-CM

## 2025-02-06 DIAGNOSIS — F02.80 MAJOR NEUROCOGNITIVE DISORDER DUE TO ALZHEIMER'S DISEASE, WITHOUT BEHAVIORAL DISTURBANCE: ICD-10-CM

## 2025-02-06 DIAGNOSIS — Z87.898 HISTORY OF SEIZURES: ICD-10-CM

## 2025-02-06 DIAGNOSIS — R68.89 FORGETFULNESS: ICD-10-CM

## 2025-02-06 DIAGNOSIS — R41.3 OTHER AMNESIA: ICD-10-CM

## 2025-02-06 DIAGNOSIS — G30.9 ALZHEIMER'S DISEASE, UNSPECIFIED (CODE): ICD-10-CM

## 2025-02-06 DIAGNOSIS — G30.9 ALZHEIMER'S DISEASE, UNSPECIFIED (CODE): Primary | ICD-10-CM

## 2025-02-06 PROCEDURE — 1160F RVW MEDS BY RX/DR IN RCRD: CPT | Mod: CPTII,S$GLB,, | Performed by: NURSE PRACTITIONER

## 2025-02-06 PROCEDURE — 3288F FALL RISK ASSESSMENT DOCD: CPT | Mod: CPTII,S$GLB,, | Performed by: NURSE PRACTITIONER

## 2025-02-06 PROCEDURE — 84393 TAU PHOSPHORYLATED EA: CPT | Performed by: NURSE PRACTITIONER

## 2025-02-06 PROCEDURE — 3008F BODY MASS INDEX DOCD: CPT | Mod: CPTII,S$GLB,, | Performed by: NURSE PRACTITIONER

## 2025-02-06 PROCEDURE — 1159F MED LIST DOCD IN RCRD: CPT | Mod: CPTII,S$GLB,, | Performed by: NURSE PRACTITIONER

## 2025-02-06 PROCEDURE — 1101F PT FALLS ASSESS-DOCD LE1/YR: CPT | Mod: CPTII,S$GLB,, | Performed by: NURSE PRACTITIONER

## 2025-02-06 PROCEDURE — 3075F SYST BP GE 130 - 139MM HG: CPT | Mod: CPTII,S$GLB,, | Performed by: NURSE PRACTITIONER

## 2025-02-06 PROCEDURE — 36415 COLL VENOUS BLD VENIPUNCTURE: CPT | Performed by: NURSE PRACTITIONER

## 2025-02-06 PROCEDURE — 99999 PR PBB SHADOW E&M-EST. PATIENT-LVL IV: CPT | Mod: PBBFAC,,, | Performed by: NURSE PRACTITIONER

## 2025-02-06 PROCEDURE — 3079F DIAST BP 80-89 MM HG: CPT | Mod: CPTII,S$GLB,, | Performed by: NURSE PRACTITIONER

## 2025-02-06 PROCEDURE — 83520 IMMUNOASSAY QUANT NOS NONAB: CPT | Performed by: NURSE PRACTITIONER

## 2025-02-06 PROCEDURE — 1126F AMNT PAIN NOTED NONE PRSNT: CPT | Mod: CPTII,S$GLB,, | Performed by: NURSE PRACTITIONER

## 2025-02-06 PROCEDURE — 99214 OFFICE O/P EST MOD 30 MIN: CPT | Mod: S$GLB,,, | Performed by: NURSE PRACTITIONER

## 2025-02-06 RX ORDER — MEMANTINE HYDROCHLORIDE 10 MG/1
10 TABLET ORAL 2 TIMES DAILY
Qty: 60 TABLET | Refills: 11 | Status: SHIPPED | OUTPATIENT
Start: 2025-02-06

## 2025-02-06 RX ORDER — DONEPEZIL HYDROCHLORIDE 10 MG/1
10 TABLET, FILM COATED ORAL NIGHTLY
Qty: 30 TABLET | Refills: 11 | Status: SHIPPED | OUTPATIENT
Start: 2025-02-06

## 2025-02-06 NOTE — PROGRESS NOTES
Subjective:       Patient ID: Liliana Martinez is a 69 y.o. female.    Chief Complaint: Major neurocognitive disorder due to Alzheimer's disease, w          HPIThe patient is here for memory loss evaluation without behavioral changes.     The patient is new to me, presenting with memory issues that began in  2020-year. The patient is accompanied by spouse. The main problems the patient has are related to forgetfulness and short term memory loss. Patient also concerned due to muliple family members with AD. Patient spouse gave many examples: For example, the patient would repeat the same question repeatedly. The patient excessively forgets where placed certain things. She misplace things and spouse assist with finding.  The patient also started forgetting names but able to recover. The patient no longer driving, stopped driving 2022 she had a seizure. No confusion around and inside the house. Has trouble remembering the date and time. Spouse assist with  keeping up with medications and appointments. Patient is unaware of major holidays and  Paitent is aware changes. The patient is independent in handling finances. The patient is still independent with ADLs. No agitation or aggression. No hallucinations or delusions. Patient had hx of  seizures, reports she was not started on seizures medications. However patient is taking   TID for chronic knee pain. Patient has history of hypothyroidism. No history of alcoholism. No history of B12 deficiency. No history of Untreated Syphilis.  No history of HIV infection. No toxic exposures.  No history of traumatic brain injury. No tremors or abnormal movements. No falls or instability. No urinary incontinence. No change in sleep and good appetite. Mother had Dementia (Late 70's) Sister (72 ye family history of dementia.       INTERVAL HISTORY 02-: Patient is present for follow up for memory manage   Patient accompanied by spouse. Patient is tolerating memory  medications  memantine/Namenda 10 mg BID and donepezil/Aricept 10 mg QHS no side effects no adverse side effects. No significant cognitive decline. No acute behavioral disturbance. Patient remains independent with ADLs'.  No issues with sleep.          Review of Systems   Unable to perform ROS: Dementia   Constitutional: Negative.    HENT: Negative.     Eyes: Negative.    Gastrointestinal: Negative.    Endocrine: Negative.    Genitourinary: Negative.    Musculoskeletal:  Positive for arthralgias and myalgias.   Skin: Negative.    Psychiatric/Behavioral:  Positive for confusion, decreased concentration and dysphoric mood. The patient is nervous/anxious.                  Current Outpatient Medications:     gabapentin (NEURONTIN) 300 MG capsule, Take 300 mg by mouth 3 (three) times daily., Disp: , Rfl:     levothyroxine (SYNTHROID) 112 MCG tablet, Take 112 mcg by mouth., Disp: , Rfl:     promethazine (PHENERGAN) 25 MG tablet, Take 25 mg by mouth every 6 (six) hours as needed for Nausea., Disp: , Rfl:     sertraline (ZOLOFT) 100 MG tablet, Take 150 mg by mouth., Disp: , Rfl:     simvastatin (ZOCOR) 20 MG tablet, Take 20 mg by mouth every evening., Disp: , Rfl:     donepeziL (ARICEPT) 10 MG tablet, Take 1 tablet (10 mg total) by mouth every evening., Disp: 30 tablet, Rfl: 11    memantine (NAMENDA) 10 MG Tab, Take 1 tablet (10 mg total) by mouth 2 (two) times daily., Disp: 60 tablet, Rfl: 11  History reviewed. No pertinent past medical history.  Past Surgical History:   Procedure Laterality Date    TOTAL KNEE ARTHROPLASTY       Social History     Socioeconomic History    Marital status:    Tobacco Use    Smoking status: Never    Smokeless tobacco: Never   Substance and Sexual Activity    Alcohol use: Never    Drug use: Never    Sexual activity: Yes             Past/Current Medical/Surgical History, Past/Current Social History, Past/Current Family History and Past/Current Medications were reviewed in  detail.        Objective:           VITAL SIGNS WERE REVIEWED      GENERAL APPEARANCE:     The patient looks comfortable.    Body habitus is normal 32.88 KG     No signs of respiratory distress.    Normal breathing pattern.    No dysmorphic features    Normal eye contact.     GENERAL MEDICAL EXAM:    HEENT:  Head is atraumatic normocephalic.     No tender temporal arteries. Fundoscopic (Ophthalmoscopic) exam showed no disc edema.      Neck and Axillae: No JVD. No visible lesions.    No carotid bruits. No thyromegaly. No lymphadenopathy.    Cardiopulmonary: No cyanosis. No tachypnea. Normal respiratory effort.    Gastrointestinal/Urogenital:  No jaundice. No stomas or lesions. No visible hernias. No catheters.     Abdomen is soft non-tender. No masses or organomegaly.    Skin, Hair and Nails: No pathognonomic skin rash. No neurofibromatosis. No visible lesions.No stigmata of autoimmune disease. No clubbing.    Skin is warm and moist. No palpable masses.    Limbs: No varicose veins. No visible swelling.    No palpable edema. Pulses are symmetric. Pedal pulses are palpable.      Muskoskeletal: No visible deformities.No visible lesions.    No spine tenderness. No signs of longstanding neuropathy. No dislocations or fractures.            Neurologic Exam     Mental Status   Oriented to person, place, and time.   Follows 2 step commands.   Attention: decreased.   Speech: speech is normal and not slurred   Level of consciousness: alert  Knowledge: inconsistent with education and poor. Unable to perform simple calculations.   Able to name object. Able to read. Able to repeat. Able to write (abnormal unble to follow simple commanad). Abnormal comprehension.     Cranial Nerves     CN II   Visual fields full to confrontation.   Visual acuity: normal with correction  Right visual field deficit: none  Left visual field deficit: none     CN III, IV, VI   Pupils are equal, round, and reactive to light.  Extraocular motions are  normal.   Right pupil: Size: 2 mm. Shape: regular. Reactivity: brisk. Consensual response: intact. Accommodation: intact.   Left pupil: Size: 2 mm. Shape: regular. Reactivity: brisk. Consensual response: intact. Accommodation: intact.   CN III: no CN III palsy  CN VI: no CN VI palsy  Nystagmus: none   Diplopia: none  Ophthalmoparesis: none  Upgaze: normal  Downgaze: normal  Conjugate gaze: present  Vestibulo-ocular reflex: present    CN V   Facial sensation intact.   Right facial sensation deficit: none  Left facial sensation deficit: none    CN VII   Right facial weakness: none  Left facial weakness: none    CN VIII   CN VIII normal.   Hearing: intact    CN IX, X   CN IX normal.   CN X normal.   Palate: symmetric  Right gag reflex: normal  Left gag reflex: normal    CN XI   CN XI normal.   Right sternocleidomastoid strength: normal  Left sternocleidomastoid strength: normal  Right trapezius strength: normal  Left trapezius strength: normal    CN XII   CN XII normal.   Tongue: not atrophic  Fasciculations: absent  Tongue deviation: none    Motor Exam   Muscle bulk: normal  Overall muscle tone: normal  Right arm tone: normal  Left arm tone: normal  Right arm pronator drift: absent  Left arm pronator drift: absent  Right leg tone: normal  Left leg tone: normal    Strength   Strength 5/5 throughout.   Right neck flexion: 5/5  Left neck flexion: 5/5  Right neck extension: 5/5  Left neck extension: 5/5  Right deltoid: 5/5  Left deltoid: 5/5  Right biceps: 5/5  Left biceps: 5/5  Right triceps: 5/5  Left triceps: 5/5  Right wrist flexion: 5/5  Left wrist flexion: 5/5  Right wrist extension: 5/5  Left wrist extension: 5/5  Right interossei: 5/5  Left interossei: 5/5  Right iliopsoas: 5/5  Left iliopsoas: 5/5  Right quadriceps: 5/5  Left quadriceps: 5/5  Right hamstrin/5  Left hamstrin/5  Right glutei: 5/5  Left glutei: 5/5  Right anterior tibial: 5/5  Left anterior tibial: 5/5  Right posterior tibial: 5/5  Left  "posterior tibial: 5/5  Right peroneal: 5/5  Left peroneal: 5/5  Right gastroc: 5/5  Left gastroc: 5/5    Sensory Exam   Right arm light touch: normal  Left arm light touch: normal  Right leg light touch: decreased from toes  Left leg light touch: decreased from toes  Right arm vibration: normal  Left arm vibration: normal  Right leg vibration: decreased from toes  Left leg vibration: decreased from toes  Right arm proprioception: normal  Left arm proprioception: normal  Right leg proprioception: decreased from toes  Left leg proprioception: decreased from toes  Right arm pinprick: normal  Left arm pinprick: normal  Right leg pinprick: decreased from toes  Left leg pinprick: decreased from toes  Graphesthesia: normal  Stereognosis: normal    Gait, Coordination, and Reflexes     Gait  Gait: normal    Coordination   Romberg: negative  Finger to nose coordination: normal  Heel to shin coordination: normal  Tandem walking coordination: normal    Tremor   Resting tremor: absent  Intention tremor: absent  Action tremor: absent    Reflexes   Right brachioradialis: 2+  Left brachioradialis: 2+  Right biceps: 2+  Left biceps: 2+  Right triceps: 2+  Left triceps: 2+  Right patellar: 2+  Left patellar: 2+  Right achilles: 2+  Left achilles: 2+  Right plantar: normal  Left plantar: normal  Right Allan: absent  Left Allan: absent  Right ankle clonus: absent  Left ankle clonus: absent  Right pendular knee jerk: absent  Left pendular knee jerk: absent          JORJE COGNITIVE ASSESSMENT (MOCA) TOTAL SCORE 16/30    MODERATE DEMENTIA 10-19    SEVERE DEMENTIA <10        DATE 10-       TOTAL SCORE 16/30       VISUOSPATIAL EXECUTIVE (5) 1       NAMING (3) 3       ATTENTION (6) 3       LANGUAGE (3) 3       ABSTRACTION(2) 2       RECALL (5)    (Recover with CUE 3)  0       ORIENTATION (6) 4           No results found for: "WBC", "HGB", "HCT", "MCV", "PLT"  Sodium   Date Value Ref Range Status   04/05/2024 137 136 - 145 " mmol/L Final     Potassium   Date Value Ref Range Status   04/05/2024 3.9 3.5 - 5.1 mmol/L Final     Chloride   Date Value Ref Range Status   04/05/2024 104 100 - 109 mmol/L Final     Carbon Dioxide   Date Value Ref Range Status   04/05/2024 22 22 - 33 mmol/L Final     Blood Urea Nitrogen   Date Value Ref Range Status   04/05/2024 13 5 - 25 mg/dL Final     Creatinine   Date Value Ref Range Status   04/05/2024 0.87 0.55 - 1.02 mg/dL Final     Calcium   Date Value Ref Range Status   04/05/2024 8.4 (L) 8.8 - 10.6 mg/dL Final     Anion Gap   Date Value Ref Range Status   04/05/2024 11 8 - 16 mmol/L Final     eGFR    Date Value Ref Range Status   04/05/2024 71 mL/min/1.73mSq Final     Comment:     In accordance with NKF-ASN Task Force recommendation, calculation based on the Chronic Kidney Disease Epidemiology Collaboration (CKD-EPI) equation without adjustment for race. eGFR adjusted for gender and age and calculated in ml/min/1.73mSquared. eGFR cannot be calculated if patient is under 18 years of age.     Reference Range:   >= 60 ml/min/1.73mSquared.     Lab Results   Component Value Date    NLEILXLM26 1207 (H) 10/16/2023     Lab Results   Component Value Date    TSH 3.896 10/16/2023    FREET4 0.83 10/16/2023     LABORATORY EVALUATION:    02-    P- 1.92 ^, P- 1.1 ^      10-    Vitamin B 12 1207 ^, RPR Neg, HIV neg, FA level 16.0 HC 6.6 NL, TSH level 3.896 NL, T4 0.83 NL    RADIOLOGY EVALUATION:    05-    MRI BRAIN WO    Mild atrophy, not unusual considering the patient age  No acute intracranial  abnormality detected     No results found in the last 24 hours.      Reviewed the neuroimaging independently       Assessment:       1. Alzheimer's disease, unspecified (CODE)    2. Other amnesia    3. Major neurocognitive disorder due to Alzheimer's disease, without behavioral disturbance    4. History of seizures    5. Forgetfulness          Plan:           MAJOR  NEUROCOGNITIVE DISORDER DUE TO POSSIBLE ALZHEIMER'S DISEASE WITHOUT BEHAVIORAL DISTURBANCE/FORGETFULNESS/ MDD/YASSINE/FAMILY HISTORY OF DEMENTIA          EVALUATION       Comprehensive Neuropsychological Testing     No longer driving.     Elevated pTau-181 & pTau  217 AD marker it is an indicator for active disease. Recommend continuing memory work up and annual cognitive assessment.     DISEASE-MODIFYING AGENTS     Explained to the patient and family that medications are intended to slow down the progression (in the early stages of the disease) and not stop it or reverse the disease. The disease is relentless, progressive and so far, cannot be controlled. Progression includes Cognitive decline, Behavioral disturbances, and Motor decline as well.     I counseled the patient and family that the rate of progression is extremely variable, and the average (10 years) is an inaccurate measure.     CLASSES:    NMDA RECEPTOR ANTAGONISTS    Continue memantine/Namenda 10 mg BID    CHOLINESTERASE INHIBITORS (CEI)    Continue donepezil/Aricept 10 mg QHS.       ANTI-AMYLOID MONOCLONAL ANTIBODIES (SPECIFICALLY FOR ALZHEIMER-TYPE DEMENTIA)      Aducanumab (Aduhelm), Lecanemab (Leqembi) and Donanemab. The actual cognitive benefits remain unsubstantiated and the risks (including death) may outweigh the benefit.The method of administration and cost remain prohibitive as well.  Will consider such options after high quality studies and post marketing experiencing demonstrate significant clinical efficacy and safety.         MISCELLANEOUS     [facility] study regarding Sildenafil (Viagra) impact on dementia showed possible benefit. Will wait for high quality prospective double-blinded placebo-controlled trials to make any proper recommendations.     Recommended against the use of OTC non-FDA evaluated supplements and claims.         SYMPTOMATIC MANAGEMENT-BEHAVIORAL SYMPTOMS AND NON-COGNITIVE SYMPTOMS       ANTIDEPRESSANTS CLASS  MEDICATIONS  Sertraline (Zoloft) titration 50  mg in the morning can mitigate anxiety symptoms.           HOME CARE AND IN FACILITY CARE         Falling Down Precautions. Gait is affected by the disease as well.     Avoid driving and access to firearms     Incremental 24/Care     Help with finances and decision making.    Join support group.    Proofing the house and use labeling.    Avoid antihistamines and anticholinergics.    Avoid changing routine.    Use written reminders.    Avoid multitasking.    Healthy diet, exercise (physical and cognitive).    Good sleep hygiene.        HERE ARE 10 WAYS TO REDUCE RISK OF DEMENTIA AND SLOW DOWN THE PROGRESSION OF DEMENTIA        1.Be physically active.    2. Avoid smoking and alcohol consumption.    3. Track your numbers. Keep your blood pressure, cholesterol, blood sugar and weight within recommended ranges.    4. Stay socially connected.    5. Make healthy food choices. Eat a well-balance and healthy diet that rich in cereals, fish, legumes, and vegetables.    6. Reduce stress.     7. Challenge your brain by trying something new, playing games, or learning a new language.    8. Take care of your hearing. Avoid being continuously exposed to loud sounds and wear a hearing aid if hearing does become a problem.    9. Lower risk of falls. Consider installing handrails on all stairs and grab bars in bathrooms.    10. Reduce your exposure to air pollution, such as exposure to exhaust in heavy traffic.         CAREGIVERS COUNSELING     Recommend reading the following books:     The 36-Hour Day and there are many online and printed resources to help caregivers as well.     Alzheimer's Through the Stages.     Dementia with G.R.A.C.E.            PREVENTION OF DELIRIUM       1. Good hydration and avoid electrolyte imbalance  2. Recognize and treat infections immediately especially UTI.  3. Bladder emptying and prevent constipation.   4. Provide stimulating activities and familiar  "objects  5. Use eyeglasses and hearing aids if needed.   6. Use simple and regular communication about people, current place, and time  7. Mobility and range-of-motion exercises  8. Reduce noise, lighting and avoid sleep interruptions  9. Non-narcotic pain management.  10.Nondrug treatment for sleep problems or anxiety  11. Avoid antihistamines.  12. Avoid narcotics.  13. Avoid benzodiazepines.            HELPFUL STRATEGIES FOR THE FAMILY AND CAREGIVERS        BEHAVIORAL MANAGEMENT STRATEGIES     Remember that you cannot reason with acute psychosis or confusion. Unless there is an immediate safety issue, there is no need to challenge or correct the person.  For example, if a pt is hallucinating, do not argue with the person that what they are seeing is not real. If they are expressing paranoia, empathize gently with their fear, and attempt to redirect them to a different activity.   If the person is particularly stuck on a topic or activity, as long as the activity is safe and is not worsening their agitation, you don't need to intervene.     Communicate calmly, simply, and concisely    Reassure the person that they are safe and you are here to help  Try not to express irritation or anger  Speak quietly and calmly, do not shout or threaten the person. Avoid provocation.   Establish verbal contact and provide orientation and reassurance.  Attempt to identify the patient's wants and feelings, listen to what they are saying, and reflect those wants and feelings back to the patient.  Dont use sarcasm as a weapon    Set clear limits on behavior in a calm voice ("You cannot hit the nurse.")  Offer choices and optimism ("Which toothpaste would you like to use today?")    Redirect the person to an alternative behavior ("Can you come help me with this?)    Avoid saying things like, "We already went over this!" "You can't keep doing this." "I already explained this to you"  Instead, simply nod calmly and acknowledge what the " "person is saying ("Yes, that makes sense."), and then request their help or company in a different behavior.   Having a mental list of activities the patient enjoys can be helpful with redirection. For example, do they enjoy going for walks? Eating a piece of candy?   If redirection becomes challenging, you can place objects that your family member enjoys strategically in the home in order to use for distraction. This is particularly useful if there are items that they often talk about or frequently ask you questions about; or if they are visually striking. Once you focus the person on this object, talk about it briefly until they are calm and then attempt to redirect to a new behavior.   Sometimes activities which are repetitive can be calming, particularly if there is a comforting sensory element. For example, pt may enjoy folding soft towels or laundry.    Limit overstimulation    Decrease distractions by turning off the TV, computer, any fluorescent lights that hum, etc.  Ask any casual visitors to leave--the fewer people the better  If the person is very agitated, avoid touching them, and respect their personal space  Sit down and ask the person to sit down also     PREVENTATIVE STRATEGIES     Try to help the patient develop a routine and stick to it  Address all immediate safety issues  Does the person still have access to the car and keys? Take the keys away, or disconnect the car battery.  Are they wandering at night? Install locks on the outside doors. A keypad lock works well. Alarms on bedroom doors can also be helpful.   Are they turning on the stove and risking a fire? If you cannot limit their access to the kitchen, you may need to unplug dangerous devices any time you are not in the room.   If the person is exhibiting poor judgment throughout the day, they likely need someone supervising them at all times.   Do they still have access to significant financial assets? Limit their access to accounts, and " "consult with a  if necessary.   Identify situations that seem to trigger agitation or a problematic behavior, and modify the person's environment to minimize or eliminate that trigger.   For example, is their behavior worse if they don't get a good night's sleep? Or if they don't eat or drink enough? If so, prioritize those activities and build behavior plans to support them.  Do they get upset about not being allowed to answer the phone when it rings? Put all of the phones in the house on "silent."   Do they become paranoid that certain family members are trying to take advantage of them? Limit contact with the targeted family member as much as possible, and re-introduce them slowly after some time has passed.   Encourage independence in daily living whenever safely possible  Encourage collaborative decision-making regarding his care as well as daily activities  Encourage regular physical exercise  Address issues that may be impacting sleep   Ex: Urology consult for frequent nighttime urination, address chronic pain that may be interfering with sleep, moving to a different room if his roommate snores loudly, make sure the room is a comfortable temperature and he has adequate pillows and blankets  Ensure he gets out into the sunlight at least once per day to encourage regular circadian cycle  No caffeine, sugar, or large meals within two hours of bedtime   Make sure room at night is dark and quiet and minimize nighttime interruptions from staff unless medically necessary   Try to help pt go to sleep and wake up at the same time every day  Monitor closely for worsening psychiatric symptoms (insomnia, social withdrawal, deterioration of personal hygiene, hostility, confusing or nonsensical speech, paranoia, hallucinations) and intervene promptly. Assess for possible antecedents, modify environment appropriately.            SLEEP HYGIENE     Poor sleep has a negative effect on cognition. Several strategies have " been shown to improve sleep:     Caffeine intake in the afternoon and evening, as well as stuffing oneself at supper, can decrease the quality of restful sleep throughout the night.   Bedtime and wake-up times should be consistent every night and morning so the body becomes used to a single routine, even on the weekends.  Engage in daily physical activity, but not 2-3 hours before bedtime.   No technology use (television, computer, iPad) 1-2 hours before bed.   Have a wind down routine (e.g., soft lights in the house, bath before bed, reduced fluid intake, songs, reading, less noise) to promote sleep readiness.   Visit the www.sleepfoundation.org for more strategies.      COGNITIVE HYGIENE     Engage in regular exercise, which increases alertness and arousal and can improve attention and focus.  Consider lower impact exercises, such as yoga or light walking.  Get a good nights sleep, as this can enhance alertness and cognition.  Eat healthy foods and balanced meals. It is notable that research indicates certain nutrients may aid in brain function, such as B vitamins (especially B6, B12, and folic acid), antioxidants (such as vitamins C and E, and beta carotene), and Omega-3 fatty acids. Talk with your physician or nutritionist about whats right for you.   Keep your brain active. Find activities to stay mentally active, such as reading, games (cards, checkers), puzzles (crosswords, Sudoku, jig saw), crafts (models, woodworking), gardening, or participating in activities in the community.  Stay socially engaged. Continue staying active with your family and friends.      FUTURE PLANNING     The patient and caregivers should consider formal arrangements to allow a designated person to make medical and financial decisions for the pt, should he/she become unable to do so.  Options to consider include designating a healthcare proxy, medical and/or financial power of , and completing advanced directives for  healthcare decisions and estate planning (e.g., finalizing a will).  If cost is prohibitive, [facility] [employer] [facility] (https://[employer].org/) provides free  for individuals with low income.       ADDITIONAL RESOURCES     [facility] (www.http://alzbr.org) have good local caregiver and patient resources.   Consider resources for support through the Governors Office of Elderly Affairs (http://goea.[place].gov/), [place] Chapter of the Alzheimers Association (www.alz.org/[place]/), the Family Caregiver [place] (www.caregiver.org), and the American Psychological Association (http://www.apa.org/pi/about/publications/caregivers/consumers/index.aspxconsumers/index.aspx).  For More Information About Hallucinations, Delusions, and Paranoia in Alzheimer's [facility] related [facility]) [facility] 1-749.469.5961 (toll-free) adear@[place].nih.gov www.[place].nih.gov/alzheimers The [employer] [place] [facility] offers information and free print publications about Alzheimer's disease and related dementias for families, caregivers, and health professionals. [facility] staff answer telephone, email, and written requests and make referrals to local and national resources        CARRIES DX OF HISTORY SEIZURES     EEG    Taking  mg po TID ( taking for CHRONIC knee pain managed by PCP)       MEDICAL/SURGICAL COMORBIDITIES     All relevant medical comorbidities noted and managed by primary care physician and medical care team.          MISCELLANEOUS MEDICAL PROBLEMS       HEALTHY LIFESTYLE AND PREVENTATIVE CARE    Encouraged the patient to adhere to the age-appropriate health maintenance guidelines including screening tests and vaccinations.     Discussed the overall importance of healthy lifestyle, optimal weight, exercise, healthy diet, good sleep hygiene and avoiding drugs including smoking, alcohol and recreational drugs. The patient  verbalized full understanding.       Advised the patient to follow COVID-19 prevention measures.       I spent 130 minutes total E/M more than 50 spent face to face.     Time spent in counseling and coordination of care including discussions etiology of diagnosis, pathogenesis of diagnosis, prognosis of diagnosis,, diagnostic results, impression and recommendations, diagnostic studies, management, risks and benefits of treatment, instructions of disease self-management, treatment instructions, follow up requirements, patient and family counseling/involvement in care compliance with treatment regimen. All of the patient's questions were answered during this discussion.         Kaela Briggs, MSN NP      Collaborating Provider: Isaura Holden MD, FAAN Neurologist/Epileptologist

## 2025-02-10 LAB
IMMUNOLOGIST REVIEW: ABNORMAL
M PHOSPHO-TAU 217: 1.1 PG/ML

## 2025-02-11 LAB — LC PHOSPHO-TAU (181P): 1.92 PG/ML (ref 0–0.97)

## 2025-02-12 NOTE — PROGRESS NOTES
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 Recommend continuing memory work up and annual cognitive assessment.

## 2025-02-20 ENCOUNTER — HOSPITAL ENCOUNTER (OUTPATIENT)
Dept: RADIOLOGY | Facility: HOSPITAL | Age: 70
Discharge: HOME | End: 2025-02-20
Attending: NURSE PRACTITIONER
Payer: MEDICARE

## 2025-02-20 DIAGNOSIS — G30.9 ALZHEIMER'S DISEASE, UNSPECIFIED (CODE): ICD-10-CM

## 2025-02-20 DIAGNOSIS — R41.3 OTHER AMNESIA: ICD-10-CM

## 2025-02-20 PROCEDURE — 70450 CT HEAD/BRAIN W/O DYE: CPT | Mod: TC
